# Patient Record
Sex: FEMALE | URBAN - METROPOLITAN AREA
[De-identification: names, ages, dates, MRNs, and addresses within clinical notes are randomized per-mention and may not be internally consistent; named-entity substitution may affect disease eponyms.]

---

## 2021-12-03 ENCOUNTER — LAB OUTSIDE AN ENCOUNTER (OUTPATIENT)
Dept: URBAN - METROPOLITAN AREA CLINIC 19 | Facility: CLINIC | Age: 51
End: 2021-12-03

## 2021-12-03 ENCOUNTER — TELEPHONE ENCOUNTER (OUTPATIENT)
Dept: URBAN - METROPOLITAN AREA CLINIC 19 | Facility: CLINIC | Age: 51
End: 2021-12-03

## 2021-12-03 ENCOUNTER — OFFICE VISIT (OUTPATIENT)
Dept: URBAN - METROPOLITAN AREA CLINIC 19 | Facility: CLINIC | Age: 51
End: 2021-12-03
Payer: COMMERCIAL

## 2021-12-03 ENCOUNTER — WEB ENCOUNTER (OUTPATIENT)
Dept: URBAN - METROPOLITAN AREA CLINIC 19 | Facility: CLINIC | Age: 51
End: 2021-12-03

## 2021-12-03 VITALS
WEIGHT: 195 LBS | SYSTOLIC BLOOD PRESSURE: 124 MMHG | BODY MASS INDEX: 34.55 KG/M2 | DIASTOLIC BLOOD PRESSURE: 76 MMHG | TEMPERATURE: 98.1 F | HEIGHT: 63 IN

## 2021-12-03 DIAGNOSIS — D50.0 IRON DEFICIENCY ANEMIA DUE TO CHRONIC BLOOD LOSS: ICD-10-CM

## 2021-12-03 DIAGNOSIS — K59.09 CHRONIC CONSTIPATION: ICD-10-CM

## 2021-12-03 PROBLEM — 14760008: Status: ACTIVE | Noted: 2021-12-03

## 2021-12-03 PROCEDURE — 99203 OFFICE O/P NEW LOW 30 MIN: CPT | Performed by: NURSE PRACTITIONER

## 2021-12-03 PROCEDURE — 99243 OFF/OP CNSLTJ NEW/EST LOW 30: CPT | Performed by: NURSE PRACTITIONER

## 2021-12-03 NOTE — HPI-TODAY'S VISIT:
The patient was referred by Dr. Roro Dolan for CHELSIE.   A copy of this document is being forwarded to the referring provider.  Ms. Laura is a 51-year-old female who presents today for iron deficiency anemia.  Labs 11/5/2021-hemoglobin 9.5, hematocrit 32.4, MCV 68, iron 25, ferritin 3.  Today, she reports that she has been anemic for over 10 years.  She reports her last EGD and colonoscopy were 2 years ago and she believes they were normal. Has never had a pillcam. She currently is taking iron.  History of heavy periods, although they are not heavy now.  She reports multiple surgeries such as gastric bypass on her stomach as well as a hiatal hernia and polyps in her stomach.  She does report taking Advil, Tylenol, Excedrin, aspirin for multiple weeks in a row in August, October, and November for tooth pain.  She reports she typically has constipation and has urgency when she has to go.  She typically going once a day with some straining.  She currently taking magnesium for this, which is helping.  No blood in stool or black stools. No family history of colon cancer.  No abdominal pain. Patient is legally blind.  She does report issues with anesthesia in the past and says she is unable to lay flat on her back.  Of note, she does not have a med list with her and does not know all of the medications she is on. She denies cardiac/kidney disease, blood thinners, and home O2. She has diabetes.

## 2021-12-27 ENCOUNTER — LAB OUTSIDE AN ENCOUNTER (OUTPATIENT)
Dept: URBAN - METROPOLITAN AREA CLINIC 19 | Facility: CLINIC | Age: 51
End: 2021-12-27

## 2021-12-29 ENCOUNTER — LAB OUTSIDE AN ENCOUNTER (OUTPATIENT)
Dept: URBAN - METROPOLITAN AREA CLINIC 19 | Facility: CLINIC | Age: 51
End: 2021-12-29

## 2021-12-29 ENCOUNTER — OFFICE VISIT (OUTPATIENT)
Dept: URBAN - METROPOLITAN AREA MEDICAL CENTER 25 | Facility: MEDICAL CENTER | Age: 51
End: 2021-12-29

## 2021-12-29 LAB
COVID-19: NEGATIVE
GLUCOSE POC: 133
PERFORMING LAB: (no result)
PERFORMING LAB: (no result)

## 2021-12-30 LAB
COVID-19: NEGATIVE
PERFORMING LAB: (no result)

## 2022-01-05 ENCOUNTER — TELEPHONE ENCOUNTER (OUTPATIENT)
Dept: URBAN - METROPOLITAN AREA CLINIC 19 | Facility: CLINIC | Age: 52
End: 2022-01-05

## 2022-04-04 ENCOUNTER — TELEPHONE ENCOUNTER (OUTPATIENT)
Dept: URBAN - METROPOLITAN AREA CLINIC 19 | Facility: CLINIC | Age: 52
End: 2022-04-04

## 2022-06-03 ENCOUNTER — TELEPHONE ENCOUNTER (OUTPATIENT)
Dept: URBAN - METROPOLITAN AREA CLINIC 18 | Facility: CLINIC | Age: 52
End: 2022-06-03

## 2022-06-09 PROBLEM — 724556004: Status: ACTIVE | Noted: 2021-12-03

## 2022-06-15 ENCOUNTER — OFFICE VISIT (OUTPATIENT)
Dept: URBAN - METROPOLITAN AREA MEDICAL CENTER 25 | Facility: MEDICAL CENTER | Age: 52
End: 2022-06-15
Payer: COMMERCIAL

## 2022-06-15 ENCOUNTER — LAB OUTSIDE AN ENCOUNTER (OUTPATIENT)
Dept: URBAN - METROPOLITAN AREA CLINIC 19 | Facility: CLINIC | Age: 52
End: 2022-06-15

## 2022-06-15 DIAGNOSIS — K63.89 BACTERIAL OVERGROWTH SYNDROME: ICD-10-CM

## 2022-06-15 DIAGNOSIS — D50.9 ANEMIA: ICD-10-CM

## 2022-06-15 LAB
GLUCOSE POC: 171
PERFORMING LAB: (no result)

## 2022-06-15 PROCEDURE — 45380 COLONOSCOPY AND BIOPSY: CPT | Performed by: INTERNAL MEDICINE

## 2022-06-15 PROCEDURE — 43239 EGD BIOPSY SINGLE/MULTIPLE: CPT | Performed by: INTERNAL MEDICINE

## 2022-06-21 ENCOUNTER — TELEPHONE ENCOUNTER (OUTPATIENT)
Dept: URBAN - METROPOLITAN AREA CLINIC 92 | Facility: CLINIC | Age: 52
End: 2022-06-21

## 2022-07-12 ENCOUNTER — OFFICE VISIT (OUTPATIENT)
Dept: URBAN - METROPOLITAN AREA CLINIC 128 | Facility: CLINIC | Age: 52
End: 2022-07-12
Payer: COMMERCIAL

## 2022-07-12 ENCOUNTER — DASHBOARD ENCOUNTERS (OUTPATIENT)
Age: 52
End: 2022-07-12

## 2022-07-12 VITALS
SYSTOLIC BLOOD PRESSURE: 120 MMHG | WEIGHT: 198.8 LBS | DIASTOLIC BLOOD PRESSURE: 97 MMHG | BODY MASS INDEX: 35.22 KG/M2 | HEIGHT: 63 IN

## 2022-07-12 DIAGNOSIS — D50.9 IRON DEFICIENCY ANEMIA, UNSPECIFIED IRON DEFICIENCY ANEMIA TYPE: ICD-10-CM

## 2022-07-12 DIAGNOSIS — K63.9: ICD-10-CM

## 2022-07-12 PROBLEM — 87522002: Status: ACTIVE | Noted: 2022-07-12

## 2022-07-12 PROCEDURE — 99214 OFFICE O/P EST MOD 30 MIN: CPT | Performed by: INTERNAL MEDICINE

## 2022-07-12 NOTE — HPI-TODAY'S VISIT:
Mrs. Laura is a 52 year old female who was seen in GI clinic on 12/3/2021 with Ms. Avelar.   On 6/15/2022 she had an EGD and colonoscopy for iron deficiency anemia. The EGD showed evidence of gastric bypass. The colonoscopy showed normal colon and terminal ileum.   Pathology of the gastric biopsies were negative for H. pylori. Jejunal biopsies showed mild intraepithelial lymphocytosis and preserved villous architecture. Terminal ileal biopsies were unremarkable.   She is legally blind and has diabetes.